# Patient Record
Sex: FEMALE | Race: OTHER | NOT HISPANIC OR LATINO | URBAN - METROPOLITAN AREA
[De-identification: names, ages, dates, MRNs, and addresses within clinical notes are randomized per-mention and may not be internally consistent; named-entity substitution may affect disease eponyms.]

---

## 2019-07-05 ENCOUNTER — EMERGENCY (EMERGENCY)
Facility: HOSPITAL | Age: 29
LOS: 1 days | Discharge: HOME | End: 2019-07-05
Attending: EMERGENCY MEDICINE | Admitting: EMERGENCY MEDICINE
Payer: COMMERCIAL

## 2019-07-05 VITALS
RESPIRATION RATE: 18 BRPM | SYSTOLIC BLOOD PRESSURE: 114 MMHG | HEART RATE: 69 BPM | TEMPERATURE: 98 F | DIASTOLIC BLOOD PRESSURE: 77 MMHG | OXYGEN SATURATION: 100 %

## 2019-07-05 VITALS
HEART RATE: 77 BPM | DIASTOLIC BLOOD PRESSURE: 83 MMHG | OXYGEN SATURATION: 99 % | HEIGHT: 67 IN | TEMPERATURE: 98 F | SYSTOLIC BLOOD PRESSURE: 144 MMHG | WEIGHT: 203.05 LBS | RESPIRATION RATE: 18 BRPM

## 2019-07-05 DIAGNOSIS — R10.9 UNSPECIFIED ABDOMINAL PAIN: ICD-10-CM

## 2019-07-05 DIAGNOSIS — N20.0 CALCULUS OF KIDNEY: ICD-10-CM

## 2019-07-05 LAB
ALBUMIN SERPL ELPH-MCNC: 4.3 G/DL — SIGNIFICANT CHANGE UP (ref 3.3–5)
ALP SERPL-CCNC: 59 U/L — SIGNIFICANT CHANGE UP (ref 40–120)
ALT FLD-CCNC: 12 U/L — SIGNIFICANT CHANGE UP (ref 10–45)
ANION GAP SERPL CALC-SCNC: 13 MMOL/L — SIGNIFICANT CHANGE UP (ref 5–17)
AST SERPL-CCNC: 20 U/L — SIGNIFICANT CHANGE UP (ref 10–40)
BILIRUB SERPL-MCNC: 0.9 MG/DL — SIGNIFICANT CHANGE UP (ref 0.2–1.2)
BUN SERPL-MCNC: 8 MG/DL — SIGNIFICANT CHANGE UP (ref 7–23)
CALCIUM SERPL-MCNC: 9 MG/DL — SIGNIFICANT CHANGE UP (ref 8.4–10.5)
CHLORIDE SERPL-SCNC: 102 MMOL/L — SIGNIFICANT CHANGE UP (ref 96–108)
CO2 SERPL-SCNC: 21 MMOL/L — LOW (ref 22–31)
CREAT SERPL-MCNC: 0.66 MG/DL — SIGNIFICANT CHANGE UP (ref 0.5–1.3)
GLUCOSE SERPL-MCNC: 91 MG/DL — SIGNIFICANT CHANGE UP (ref 70–99)
HCT VFR BLD CALC: 43 % — SIGNIFICANT CHANGE UP (ref 34.5–45)
HGB BLD-MCNC: 14.7 G/DL — SIGNIFICANT CHANGE UP (ref 11.5–15.5)
MCHC RBC-ENTMCNC: 32.1 PG — SIGNIFICANT CHANGE UP (ref 27–34)
MCHC RBC-ENTMCNC: 34.2 GM/DL — SIGNIFICANT CHANGE UP (ref 32–36)
MCV RBC AUTO: 93.9 FL — SIGNIFICANT CHANGE UP (ref 80–100)
NRBC # BLD: 0 /100 WBCS — SIGNIFICANT CHANGE UP (ref 0–0)
PLATELET # BLD AUTO: 235 K/UL — SIGNIFICANT CHANGE UP (ref 150–400)
POTASSIUM SERPL-MCNC: 4 MMOL/L — SIGNIFICANT CHANGE UP (ref 3.5–5.3)
POTASSIUM SERPL-SCNC: 4 MMOL/L — SIGNIFICANT CHANGE UP (ref 3.5–5.3)
PROT SERPL-MCNC: 7.1 G/DL — SIGNIFICANT CHANGE UP (ref 6–8.3)
RBC # BLD: 4.58 M/UL — SIGNIFICANT CHANGE UP (ref 3.8–5.2)
RBC # FLD: 12.3 % — SIGNIFICANT CHANGE UP (ref 10.3–14.5)
SODIUM SERPL-SCNC: 136 MMOL/L — SIGNIFICANT CHANGE UP (ref 135–145)
WBC # BLD: 11.72 K/UL — HIGH (ref 3.8–10.5)
WBC # FLD AUTO: 11.72 K/UL — HIGH (ref 3.8–10.5)

## 2019-07-05 PROCEDURE — 80053 COMPREHEN METABOLIC PANEL: CPT

## 2019-07-05 PROCEDURE — 99284 EMERGENCY DEPT VISIT MOD MDM: CPT

## 2019-07-05 PROCEDURE — 99284 EMERGENCY DEPT VISIT MOD MDM: CPT | Mod: 25

## 2019-07-05 PROCEDURE — 85027 COMPLETE CBC AUTOMATED: CPT

## 2019-07-05 PROCEDURE — 36415 COLL VENOUS BLD VENIPUNCTURE: CPT

## 2019-07-05 PROCEDURE — 96374 THER/PROPH/DIAG INJ IV PUSH: CPT

## 2019-07-05 RX ORDER — TAMSULOSIN HYDROCHLORIDE 0.4 MG/1
1 CAPSULE ORAL
Qty: 10 | Refills: 0
Start: 2019-07-05 | End: 2019-07-14

## 2019-07-05 RX ORDER — TAMSULOSIN HYDROCHLORIDE 0.4 MG/1
0.4 CAPSULE ORAL ONCE
Refills: 0 | Status: COMPLETED | OUTPATIENT
Start: 2019-07-05 | End: 2019-07-05

## 2019-07-05 RX ORDER — SODIUM CHLORIDE 9 MG/ML
1000 INJECTION INTRAMUSCULAR; INTRAVENOUS; SUBCUTANEOUS ONCE
Refills: 0 | Status: COMPLETED | OUTPATIENT
Start: 2019-07-05 | End: 2019-07-05

## 2019-07-05 RX ORDER — KETOROLAC TROMETHAMINE 30 MG/ML
30 SYRINGE (ML) INJECTION ONCE
Refills: 0 | Status: DISCONTINUED | OUTPATIENT
Start: 2019-07-05 | End: 2019-07-05

## 2019-07-05 RX ADMIN — Medication 30 MILLIGRAM(S): at 22:09

## 2019-07-05 RX ADMIN — SODIUM CHLORIDE 1000 MILLILITER(S): 9 INJECTION INTRAMUSCULAR; INTRAVENOUS; SUBCUTANEOUS at 22:09

## 2019-07-05 RX ADMIN — TAMSULOSIN HYDROCHLORIDE 0.4 MILLIGRAM(S): 0.4 CAPSULE ORAL at 23:17

## 2019-07-05 RX ADMIN — Medication 30 MILLIGRAM(S): at 22:57

## 2019-07-05 NOTE — ED PROVIDER NOTE - NSFOLLOWUPINSTRUCTIONS_ED_ALL_ED_FT
Renal Colic  Image   Renal colic is pain that is caused by passing a kidney stone. The pain can be sharp and severe. It may be felt in the back, abdomen, side (flank), or groin. It can cause nausea. Renal colic can come and go.    Follow these instructions at home:  Watch your condition for any changes. The following actions may help to lessen any discomfort that you are feeling:    Medicines     Take over-the-counter and prescription medicines only as told by your health care provider.  Do not drive or use heavy machinery while taking prescription pain medicine.  Eating and drinking     Image   Drink enough fluid to keep your urine pale yellow. You may be instructed to drink at least 8–10 glasses of water each day. Follow instructions from your health care provider.  If directed, change your diet. This may include:  Limiting how much sodium you eat. You may need to eat less than 2 grams (2,000 mg) per day.  Eating more fruits and vegetables.  Limiting how much animal protein, such as red meat, poultry, fish, and eggs, you eat.  Avoiding foods such as spinach, rhubarb, sweet potatoes, and nuts. These make kidney stones more likely to form.  Follow instructions from your health care provider about eating or drinking restrictions.  General instructions     Keep all follow-up visits as told by your health care provider. This is important.  Collect urine samples as told by your health care provider. You may need to collect a urine sample:  24 hours after you pass the stone.  8–12 weeks after passing the kidney stone, and every 6–12 months after that.  Strain your urine every time you urinate, for as long as directed. Use the strainer that your health care provider recommends.  Do not throw out the kidney stone after passing it. Keep the stone so it can be tested by your health care provider. Testing the makeup of your kidney stone may help understand how to prevent you from getting kidney stones in the future.  Contact a health care provider if:  You have a fever or chills.  Your urine smells bad or looks cloudy.  You have pain or burning when you pass urine.  Get help right away if:  Your flank pain or groin pain suddenly worsens.  You become confused or disoriented or you lose consciousness.  Summary  Renal colic is pain that is caused by passing a kidney stone.  Take over-the-counter and prescription medicines only as told by your health care provider.  Drink enough fluid to keep your urine pale yellow. You may be instructed to drink at least 8–10 glasses of water each day. Follow instructions from your health care provider.  Strain your urine every time you urinate, for as long as directed. Use the strainer that your health care provider recommends.  Do not throw out the kidney stone after passing it. Keep the stone so it can be tested by your health care provider.  This information is not intended to replace advice given to you by your health care provider. Make sure you discuss any questions you have with your health care provider.    Document Released: 09/27/2006 Document Revised: 01/15/2019 Document Reviewed: 01/15/2019  [employer] Interactive Patient Education © 2019 [employer]

## 2019-07-05 NOTE — ED PROVIDER NOTE - OBJECTIVE STATEMENT
29 F w abd pain- R sided abd pain x 1 day no n/v no f/c  no urg/freq/dysuria-  outpx ct shows R sided kidney stone  unsure size  pain is mild- less than previous  no exac/allev factors

## 2019-07-05 NOTE — ED ADULT NURSE NOTE - OBJECTIVE STATEMENT
C/O of right flank pain, states sent from [facility] w/ CT scan w/ confirmed kidney stones.  No nausea/vomitting, no dysuria/hematuria.

## 2019-07-05 NOTE — ED PROVIDER NOTE - CLINICAL SUMMARY MEDICAL DECISION MAKING FREE TEXT BOX
d/w radiology- 6 mm stone, distal ureter- mild hydro- pain well controlled- started today- d/c home nsaid, flomax- si/sx to return for explained to px

## 2019-07-09 PROBLEM — Z00.00 ENCOUNTER FOR PREVENTIVE HEALTH EXAMINATION: Status: ACTIVE | Noted: 2019-07-09

## 2019-07-15 ENCOUNTER — INBOUND DOCUMENT (OUTPATIENT)
Age: 29
End: 2019-07-15

## 2019-07-23 ENCOUNTER — APPOINTMENT (OUTPATIENT)
Dept: UROLOGY | Facility: CLINIC | Age: 29
End: 2019-07-23
Payer: COMMERCIAL

## 2019-07-23 VITALS — TEMPERATURE: 98.4 F | SYSTOLIC BLOOD PRESSURE: 87 MMHG | DIASTOLIC BLOOD PRESSURE: 53 MMHG | HEART RATE: 75 BPM

## 2019-07-23 DIAGNOSIS — N20.1 CALCULUS OF URETER: ICD-10-CM

## 2019-07-23 PROCEDURE — 99204 OFFICE O/P NEW MOD 45 MIN: CPT

## 2019-07-23 NOTE — PHYSICAL EXAM
[General Appearance - Well Developed] : well developed [Normal Appearance] : normal appearance [Well Groomed] : well groomed [General Appearance - Well Nourished] : well nourished [Heart Rate And Rhythm] : Heart rate and rhythm were normal [] : no respiratory distress [Abdomen Soft] : soft [Abdomen Tenderness] : non-tender [Abdomen Hernia] : no hernia was discovered [Costovertebral Angle Tenderness] : no ~M costovertebral angle tenderness [Normal Station and Gait] : the gait and station were normal for the patient's age [Urinary Bladder Findings] : the bladder was normal on palpation [No Focal Deficits] : no focal deficits [Oriented To Time, Place, And Person] : oriented to person, place, and time [Skin Color & Pigmentation] : normal skin color and pigmentation [No Palpable Adenopathy] : no palpable adenopathy

## 2019-07-23 NOTE — HISTORY OF PRESENT ILLNESS
[FreeTextEntry1] : 29F 2 weeks ago with severe right flank pain. went to [facility]. told has kidney stones. no fevers chills. pain has now resolved. no stone seen. no nausea vomting. no gross hematuria. occasional urgency. mild frequency. recently stopped flomax. no incontinence. has increased fluid intrake. no family history kidney bladder porstate cancer. on review of ct scan - distal ureteral/UVJ stone. 4-5 mm.

## 2019-07-24 LAB
ALBUMIN SERPL ELPH-MCNC: 4.1 G/DL
ALP BLD-CCNC: 52 U/L
ALT SERPL-CCNC: 8 U/L
ANION GAP SERPL CALC-SCNC: 12 MMOL/L
APPEARANCE: ABNORMAL
AST SERPL-CCNC: 12 U/L
BACTERIA: NEGATIVE
BILIRUB SERPL-MCNC: 0.3 MG/DL
BILIRUBIN URINE: NEGATIVE
BLOOD URINE: ABNORMAL
BUN SERPL-MCNC: 13 MG/DL
CALCIUM SERPL-MCNC: 9.1 MG/DL
CHLORIDE SERPL-SCNC: 106 MMOL/L
CO2 SERPL-SCNC: 24 MMOL/L
COLOR: YELLOW
CREAT SERPL-MCNC: 0.74 MG/DL
GLUCOSE QUALITATIVE U: NEGATIVE
GLUCOSE SERPL-MCNC: 88 MG/DL
HYALINE CASTS: 3 /LPF
KETONES URINE: NEGATIVE
LEUKOCYTE ESTERASE URINE: NEGATIVE
MICROSCOPIC-UA: NORMAL
NITRITE URINE: NEGATIVE
PH URINE: 6
POTASSIUM SERPL-SCNC: 3.7 MMOL/L
PROT SERPL-MCNC: 6 G/DL
PROTEIN URINE: NORMAL
RED BLOOD CELLS URINE: 3 /HPF
SODIUM SERPL-SCNC: 142 MMOL/L
SPECIFIC GRAVITY URINE: 1.02
SQUAMOUS EPITHELIAL CELLS: 5 /HPF
UROBILINOGEN URINE: NORMAL
WHITE BLOOD CELLS URINE: 3 /HPF

## 2019-07-29 ENCOUNTER — APPOINTMENT (OUTPATIENT)
Dept: ULTRASOUND IMAGING | Facility: HOSPITAL | Age: 29
End: 2019-07-29

## 2019-07-29 LAB — BACTERIA UR CULT: NORMAL

## 2019-08-09 ENCOUNTER — APPOINTMENT (OUTPATIENT)
Dept: UROLOGY | Facility: CLINIC | Age: 29
End: 2019-08-09

## 2022-12-05 NOTE — ED ADULT TRIAGE NOTE - HEART RATE (BEATS/MIN)
77 Detail Level: Zone Plan: Neutrogena ultra gentle hydrating cleanser, Cerave pm moisturizer. Render In Strict Bullet Format?: No

## 2023-07-24 NOTE — ED ADULT NURSE NOTE - NSSUSCREENINGQ3_ED_ALL_ED
How Severe Are Your Bumps?: mild
Have Your Bumps Been Treated?: not been treated
Is This A New Presentation, Or A Follow-Up?: Bump
No

## 2024-11-06 NOTE — ED ADULT NURSE NOTE - NSSUSCREENINGQ1_ED_ALL_ED
New Knee      Patient: Vernell Ornelas        YOB: 1999    Medical Record Number: [id number]        Chief Complaints: Right knee pain    History of Present Illness: This is a 25-year-old female have seen in the past for her shoulder who presents complaining of right knee pain she states it really began last December she was found to have a stress fracture in her femoral condyle.  She did denies any history injury change in activity that would initiated this.  She does states she had a left hip injury and was modifying her gait and that might have been it.  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     No current facility-administered medications on file prior to visit.     Current Medications:  Scheduled Meds:  Continuous Infusions:No current facility-administered medications for this visit.    PRN Meds:.    Past Medical History:   Diagnosis Date    Abnormal Pap smear of cervix     Joint pain     or swelling        Past Surgical History:   Procedure Laterality Date    CYSTECTOMY  07/2015    cyst removal on left foot     WISDOM TOOTH EXTRACTION          Social History     Occupational History    Not on file   Tobacco Use    Smoking status: Never    Smokeless tobacco: Never   Vaping Use    Vaping status: Never Used   Substance and Sexual  Activity    Alcohol use: No    Drug use: No    Sexual activity: Yes     Partners: Male     Birth control/protection: Vasectomy      Social History     Social History Narrative    Not on file        Family History   Problem Relation Age of Onset    Hypertension Other     Heart disease Other     Hypertension Mother     Thyroid disease Mother     Depression Mother     Anxiety disorder Mother     Heart disease Maternal Grandmother     Anxiety disorder Maternal Grandfather     Deep vein thrombosis Paternal Grandfather     Breast cancer Neg Hx     Ovarian cancer Neg Hx     Colon cancer Neg Hx     Pulmonary embolism Neg Hx     Birth defects Neg Hx     Mental retardation Neg Hx              Review of Systems:     Review of Systems      Physical Exam: 25 y.o. female  General Appearance:    Alert, cooperative, in no acute distress                 There were no vitals filed for this visit.   Patient is alert and read ×3 no acute distress appears her above-listed at height weight and age.  Affect is normal respiratory rate is normal unlabored. Heart rate regular rate rhythm, sclera, dentition and hearing are normal for the purpose of this exam.        Ortho Exam  Physical exam of the right  knee reveals no effusion no redness.  The patient does have tenderness about the medial l joint line.  No tenderness about the lateral l joint line.  A negative bounce home and a positive l medial Zayda.    Patient has a stable ligamentous exam.  The patient has a negative Lachman and negative anterior drawer and a negative pivot shift.  Quads are reasonable and symmetric bilaterally.  Calf is soft and nontender.  There is no overlying skin changes no lymphedema lymphadenopathy.  Patient has good hip range of motion full symmetric and asymptomatic and a normal ankle exam.  She has good distal pulses and sensation distally is intact   Physical Exam: 25 y.o. female  General Appearance:    Alert, cooperative, in no acute distress              "         Vitals:    11/08/24 0827   Temp: 98 °F (36.7 °C)   Weight: 58.7 kg (129 lb 6.4 oz)   Height: 157.5 cm (62\")   PainSc: 0-No pain   PainLoc: Knee        Head:    Normocephalic, without obvious abnormality, atraumatic   Eyes:            conjunctivae and sclerae normal, no pallor, corneas clear,    Ears:    Ears appear intact with no abnormalities noted   Throat:   No oral lesions, no thrush, oral mucosa moist   Neck:   No adenopathy, supple, trachea midline, no thyromegaly,    Back:     No kyphosis present, no scoliosis present, no skin lesions,      erythema or scars, no tenderness to percussion or                   palpation,   range of motion normal   Lungs:     ,respirations regular, even and                  unlabored    Heart:    Regular rhythm and normal rate               Chest Wall:    No abnormalities observed               Pulses:   Pulses palpable and equal bilaterally   Skin:   No bleeding, bruising or rash   Lymph nodes:   No palpable adenopathy   Neurologic:   Appears neurologic intact         Procedures             Radiology:   AP, Lateral and merchant views of the right knee  were /reviewed to evauateknee pain.  These are normal she has good maintenance with joint space I did review both MRIs second and still shows area of the stress fracture and a little more change in the medial femoral condyle cartilage  Imaging Results (Most Recent)       None          Assessment/Plan:    Right knee medial femoral condyle stress fracture and injury to the overlying cartilage.  I think she is failed conservative management before of injections physical therapy plan is to proceed with arthroscopy, arthroscopy this could include debridement of the overlying cartilage if she does not have a full-thickness chondral loss and I feel like I can get to it with a subchondroplasty I would proceed with that.  If she has full-thickness chondral loss I will not proceed that is a contraindication.  At that point I would " debride the edges of the defect and hopefully give her some relief.  I told her I could find no means guarantee to her that make her knee normal we are hoping it is better I do not know that I can make it such that she can run etc.    We did discuss in detail risk benefits and alternatives  The patient voiced understanding of the risks, benefits, and alternative forms of treatment that were discussed and the patient consents to proceed with the above listed surgery.  All risks, benefits and alternatives were discussed.  Risks including to but not exclusive to anesthetic complications, including death, MI, CVA, infection, bleeding DVT, fracture, residual pain and need for future surgery.  She understands and agrees to proceed                           No